# Patient Record
Sex: MALE | Race: BLACK OR AFRICAN AMERICAN | URBAN - METROPOLITAN AREA
[De-identification: names, ages, dates, MRNs, and addresses within clinical notes are randomized per-mention and may not be internally consistent; named-entity substitution may affect disease eponyms.]

---

## 2020-06-25 ENCOUNTER — HOSPITAL ENCOUNTER (OUTPATIENT)
Dept: MEDSURG UNIT | Facility: HOSPITAL | Age: 30
End: 2020-06-29
Attending: INTERNAL MEDICINE | Admitting: INTERNAL MEDICINE

## 2020-07-01 LAB
FINAL CULTURE: NORMAL
GRAM STN SPEC: NORMAL

## 2022-04-30 NOTE — ED PROVIDER NOTES
Patient:   Kvng Gonzales             MRN: [id number]            FIN: [id number]               Age:   30 years     Sex:  Male     :  1990   Associated Diagnoses:   Seizure; Hypoglycemia   Author:   aKden Walsh MD      Basic Information   Time seen: Immediately upon arrival.   History source: Patient, EMS.   Arrival mode: Ambulance.   History limitation: Clinical condition.   Additional information: Chief Complaint from Nursing Triage Note : Chief Complaint   2020 23:53 CDT      Chief Complaint           pt here c/o syncopal episode, was driving with friends and friends had him pull over. They called gene and reported pt had passed out, dheeraj unsure of length of episode. vss.  .   Provider/Visit info:   Time Seen:  Kaden Walsh MD / 2020 23:54  .   History of Present Illness   The patient presents with syncope.  The onset was just prior to arrival.  The course/duration of symptoms is episodic: with a single episode.  The location where the incident occurred was in the street.  The exacerbating factor is none.  The relieving factor is none.  Risk factors consist of HIV.  Prior episodes: none.  Therapy today: none.  Preceding symptoms: none.  Associated injury to the confusion after the event..     seizure activity reported by friends at scene.      Review of Systems   Constitutional symptoms:  No fever, no chills, no sweats.    Skin symptoms:  No rash,    Eye symptoms:  No recent vision problems,    ENMT symptoms:  No sore throat,    Respiratory symptoms:  No shortness of breath, no cough.    Cardiovascular symptoms:  Negative except as documented in HPI.   Gastrointestinal symptoms:  No abdominal pain, no nausea, no vomiting.    Genitourinary symptoms:  No dysuria,    Musculoskeletal symptoms:  No back pain, no Muscle pain.    Neurologic symptoms:  Negative except as documented in HPI.             Additional review of systems information: All other systems reviewed and otherwise  negative.      Health Status   Allergies:    Allergic Reactions (Selected)  Severity Not Documented  Penicillins- No reactions were documented.,    Allergies (1) Active Reaction  penicillins None Documented  .   Medications:  (Selected)   Prescriptions  Prescribed  Vitamin D3 1000 intl units oral tablet: 1,000 IntUnit = 1 tab(s), Oral, Daily, # 30 tab(s), 0 Refill(s), other reason (Rx)  efavirenz/emtricitabine/tenofovir 600 mg-200 mg-300 mg oral tablet: 1 tab(s), Oral, Daily, # 30 tab(s), 0 Refill(s), [facility]  ketoconazole 2% topical cream: 1 sandra, TOP, BID, apply to bilat groin area for rash, # 60 gm, 0 Refill(s), Pharmacy: [facility]  Documented Medications  Documented  ACETAMINOPHEN-COD #3 TABLET: 1 tab(s), Oral, q6hr  CLINDAMYCIN 150MG CAPSULES: 150 mg = 1 cap(s), Oral, QID.      Past Medical/ Family/ Social History   Medical history:    Resolved  HIV ([id number]):  Resolved..   Surgical history:    None..   Family history:    Alzheimer's disease  Father  Prostate cancer                                                                                                                                                                                                                         27-APR-2016 23:30:55<$>  Father  .   Social history:    Social & Psychosocial Habits    Alcohol  02/08/2016 Risk Assessment: Medium Risk    02/08/2016  Use: Current    Type: Beer, Liquor    Frequency: 1-2 times per week    Has alcohol use interfered with work or home life? No    Do you ever drink more than intended? No    Has anyone been hurt or at risk by your drinking? No    Ready to change: No    Concerns about alcohol use in household: No    Employment/School  02/08/2016 Risk Assessment: Not employed or in school    Exercise  02/08/2016 Risk Assessment: Regular exercise    Home/Environment  02/08/2016 Risk Assessment: No Risk    02/08/2016  Lives with: Room Mate    Alcohol abuse in  household: Yes    Substance abuse in household: No    Smoker in household: Yes    Injuries/Abuse/Neglect in household: No    Feels unsafe at home: No    Safe place to go: Yes    Agency(s)/Others notified: No    Family/Friends available to help: Yes    Concern for family members at home: No    Major illness in household: Yes    Financial concerns: Yes    Concerns over TV/Computer/Game use: No    Nutrition/Health  02/08/2016 Risk Assessment: No Risk    02/08/2016  Type of diet: Regular    Comment: Eating well - 02/08/2016 09:27 - Kelsea Contreras LPN    Substance Use  02/08/2016 Risk Assessment: Low Risk    02/08/2016  Use: Past    Type: Methamphetamines    Tobacco  02/08/2016 Risk Assessment: Medium Risk    02/08/2016  Use: Current every day smoker    Type: Cigarettes    Tobacco use per day: 8    Number of years: .5    Previous treatment: None    Ready to change: Yes    Concerns about tobacco use in household: Yes    06/25/2020  Use: Former smoker, quit more    Patient Wants Consult For Cessation Counseling N/A    06/26/2020  Use: Former smoker, quit more    Patient Wants Consult For Cessation Counseling N/A    Abuse/Neglect  06/26/2020  SHX Any signs of abuse or neglect No  .   Problem list:    Active Problems (1)  Tobacco user   .      Physical Examination               Vital Signs      Vital Signs (last 24 hrs)_____  Last Charted___________  Temp Oral     36.5 DegC  (JUN 25 23:53)  Heart Rate Peripheral   81 bpm  (JUN 25 23:53)  Resp Rate         20 br/min  (JUN 25 23:53)  SBP      132 mmHg  (JUN 25 23:53)  DBP      81 mmHg  (JUN 25 23:53)  SpO2      96 %  (JUN 25 23:53)  Weight      88.7 kg  (JUN 25 23:53)  Height      180 cm  (JUN 25 23:53)  BMI      27.38  (JUN 25 23:53)  .   General:  Alert, no acute distress.    Skin:  Warm, dry.    Head:  Normocephalic.   Neck:  No tenderness.   Eye:  Pupils are equal, round and reactive to light, extraocular movements are intact.    Ears, nose, mouth and throat:  Oral  mucosa moist.   Cardiovascular:  Regular rate and rhythm, No murmur, Normal peripheral perfusion, No edema.    Respiratory:  Lungs are clear to auscultation, respirations are non-labored, breath sounds are equal, Symmetrical chest wall expansion.    Gastrointestinal:  Soft, Nontender, Non distended, Normal bowel sounds, No organomegaly.    Back:  Normal alignment.   Musculoskeletal:  No swelling, no deformity.    Neurological:  Alert and oriented to person, place, time, and situation, normal motor observed, normal speech observed.    Psychiatric:  Cooperative.      Medical Decision Making   Documents reviewed:  Emergency department nurses' notes.   Results review:  Lab results : Lab View   6/26/2020 1:06 CDT       U pH                      6.5                             UA Appear                 Clear                             UA Color                  YELLOW                             UA Spec Grav              1.030                             UA Bili                   Negative                             UA pH                     6.5                             UA Urobilinogen           3 mg/dL                             UA Blood                  Negative                             UA Glucose                Negative                             UA Ketones                Trace                             UA Protein                20 mg/dL                             UA Nitrite                Negative                             UA Leuk Est               Negative                             UA WBC Interp             0-2 /HPF                             UA RBC Interp             0-2 /HPF                             UA Bact Interp            None Seen /HPF                             UA Squam Epi Interp        /LPF                             UA Hyal Cast Interp       0-2 /LPF                             U Temp                    24.5 DegC                             U Amph Scr                Negative                              U Barbie Scr                Negative                             U Benzodia Scr            Negative                             U Cannab Scr              Negative                             U Cocaine Scr             Negative                             U Opiate Scr              Negative                             U Phencyclidine Scr       Negative    6/26/2020 0:10 CDT       Sodium Lvl                140 mmol/L                             Potassium Lvl             3.6 mmol/L                             Chloride                  107 mmol/L                             CO2                       28 mmol/L                             Calcium Lvl               9.0 mg/dL                             Glucose Lvl               90 mg/dL                             BUN                       9 mg/dL                             Creatinine                1.10 mg/dL                             eGFR-AA                   103 mL/min                             eGFR-ESTEFANY                  85 mL/min  LOW                             Bili Total                0.7 mg/dL                             Bili Direct               0.2 mg/dL                             Bili Indirect             0.5 mg/dL                             AST                       28 unit/L                             ALT                       34 unit/L                             Alk Phos                  65 unit/L                             Total Protein             7.8 gm/dL                             Albumin Lvl               3.6 gm/dL                             Globulin                  4.20 gm/mL  HI                             A/G Ratio                 0.9 ratio  LOW                             WBC                       8.0 x10(3)/mcL                             RBC                       4.79 x10(6)/mcL                             Hgb                       14.7 gm/dL                             Hct                       43.2 %                              Platelet                  283 x10(3)/mcL                             MCV                       90.2 fL                             MCH                       30.7 pg                             MCHC                      34.0 gm/dL                             RDW                       13.3 %                             MPV                       9.6 fL                             Abs Neut                  3.16 x10(3)/mcL                             Neutro Auto               39 %  NA                             Lymph Auto                48 %  NA                             Mono Auto                 8 %  NA                             Eos Auto                  4 %  NA                             Abs Eos                   0.3 x10(3)/mcL                             Basophil Auto             1 %  NA                             Abs Neutro                3.16 x10(3)/mcL                             Abs Lymph                 3.9 x10(3)/mcL                             Abs Mono                  0.6 x10(3)/mcL                             Abs Baso                  0.1 x10(3)/mcL                             IG%                       0 %  NA                             IG#                       0.0100  NA  .      Impression and Plan   Diagnosis   Seizure (OGF76-YP R56.9)   Hypoglycemia (AMJ93-KF E16.2)   Plan   Condition: Guarded.    Disposition: Admit time  6/26/2020 02:15:00, Place in Observation Unit.

## 2022-05-02 NOTE — HISTORICAL [facility] CERNER
This is a historical note converted from Lilly. Formatting and pictures may have been removed.  Please reference Lilly for original formatting and attached multimedia. Admit and Discharge Dates  Admit Date: 06/25/2020  Discharge Date: 06/29/2020  Physicians  Attending Physician - Harshil BANUELOS, Yesica ELIZONDO  Admitting Physician - Harshil BANUELOS, Yesica ELIZONDO  Consulting Physician - Iglesia BANUELOS, John Teague  Consulting Physician - Fawn BANUELOS, Obi KHANNA  Primary Care Physician - No PCP, No  Discharge Diagnosis  New Onset Seizure suspected 2/2 hypoglycemia  HIV. CD4 998.  Hospital Course  30-year-old male presents emergency department via EMS after having a seizure. ?Patient states he was driving with some friends when he started feeling weird and pulled over. ?He states that his friends noted seizure activity and called EMS. ?When EMS arrived his CBG was in the 50s. ?They gave him D50 and brought him to emergency department. ?Patient denies any fevers, chills or shortness of breath. ?No coughing. ?No recent illnesses. ?He does state he started having skin breakdown and sores on his upper and lower extremities. ?Patient denies any nausea, vomiting, constipation or diarrhea. ?Last meal approximately 2 hours prior to seizure episode. ?States good appetite and eats 3 meals a day with no meal skipped.  ?  Seizure  -Possibly secondary to hypoglycemia; CBG 50 in the field; hypoglycemia resolved; No history of seizure disorder or prior seizures.  -No neuro deficits; CT head and MRI brain?with no intracranial processes; TTE negative  -D/c Keppra b/c patient c/o of?fatigue and worsening dizziness, ctm  -Pending further lumbar puncture results. Does not seem to be OI?with HIV due to CD4 of 998.  ?  HIV  -Not compliant with antiretrovirals for at least 3 months  -Follows with ID doctor, Dr Gill,?in [place], [place].? Continue Jenvoya. Will visit with Dr Gill after discharge.  -CD4 count of 998. Unlikely to be opportunistic infection.  -LP performed on  6/26. Cell count not indicative of active infection. Protein and glucose wnl. Awaiting?remaining LP studies upon discharge. Follow up on?LP studies vdrl, hiv vl, wnv pcr, hsv pcr, cryptococcal Ag, and serum cmv, syphilis studies, procalcitonin, crypto ag?.  ?  Hx of treated syphilis  -f/u repeat RPR today (cannot rule out prozone phenomenon)  Time Spent on discharge  >35 minutes  Objective  Vitals & Measurements  T:?36.6? ?C (Oral)? TMIN:?35.8? ?C (Axillary)? TMAX:?36.8? ?C (Oral)? HR:?76(Peripheral)? HR:?76(Monitored)? RR:?20? BP:?126/68? SpO2:?98%?  Physical Exam  General:?well-developed well-nourished in no acute distress  HENT:?normocephalic, atraumatic  Neck: full range of motion, no thyromegaly or lymphadenopathy  Cardiovascular:?regular rate and rhythm without murmurs, gallops or rubs; no peripheral edema  Gastrointestinal:?soft, non-tender, non-distended with normal bowel sounds, without masses to palpation  Musculoskeletal:?full range of motion of all extremities/spine without limitation or discomfort  Integumentary: no rashes or skin lesions present  Neurologic:? no signs of peripheral neurological deficit, motor/sensory function intact; neg brudzinski and kernig signs  ?  Patient Discharge Condition  clinically and hemodynamically stable  Discharge Disposition  1) ensure patient has not had any more seizures  2) no driving for 6 months  3) follow up remaining LP studies: vdrl, hiv vl, wnv pcr, hsv pcr, cryptococcal Ag, and serum cmv, syphilis studies, procalcitonin, crypto ag?.   Discharge Medication Reconciliation  Prescribed  acetaminophen-codeine (acetaminophen-codeine 300 mg-30 mg oral tablet)?1 tab(s), Oral, q6hr  Continue  cholecalciferol (Vitamin D3 1000 intl units oral tablet)?1,000 IntUnit, Oral, Daily  efavirenz/emtricitabine/tenofovir (efavirenz/emtricitabine/tenofovir 600 mg-200 mg-300 mg oral tablet)?1 tab(s), Oral, Daily  Discontinue  clindamycin (CLINDAMYCIN 150MG CAPSULES)?150 mg, Oral,  QID  ketoconazole topical (ketoconazole 2% topical cream)?1 sandra, TOP, BID  Education and Orders Provided  AIDS and the Nervous System  Epilepsy  Hypoglycemia  Lumbar Puncture  Discharge - 06/29/20 11:49:00 CDT, Home?  Follow up  Pt to Follow up with [facility] in [facility] with Dr. Gill  Will call patient if CSF studies come back abnormal  Report to ED if symptoms return / worsen      I discussed the case with the resident, assisted with the development of the assessment and agree with the plan of care.? Given was an isolated event described as a sz and pt didnt tolerate keppra, together agreed to d/c keppra.? Hypoglycemia also did not reoccur.? Counseled pt further workup for these unnecessary unless reoccurs.? No driving for 6 months, f/u w/ ID in [place] as above.

## 2022-05-02 NOTE — HISTORICAL [facility] CERNER
This is a historical note converted from Lilly. Formatting and pictures may have been removed.  Please reference Lilly for original formatting and attached multimedia. Chief Complaint  pt here c/o syncopal episode, was driving with friends and friends had him pull over. They called gene and reported pt had passed out, dheeraj unsure of length of episode. vss.  Reason for Consultation  HIV, AMS/Seizure  History of Present Illness  29 yo male with previously well controlled HIV disease admitted secondary to new onset seizure +/- syncopal episode.? ID consulted to provide recommendations as to same given his HIV status.  ?  CD4 found to be 998 with HIV VL pending at this juncture.?  ?  ?  Apparently friends noticed seizure activity, was found interestingly to have CBG of 50 in route also?  No cp/sob/ha/changes in vision  ?  Labs Last 24 Hours?  ?Chemistry? Hematology/Coagulation?   Sodium Lvl: 140 mmol/L (06/26/20 05:50:00) WBC: 7.4 x10(3)/mcL (06/26/20 05:50:00)   Potassium Lvl: 3.5 mmol/L (06/26/20 05:50:00) RBC: 4.54 x10(6)/mcL (06/26/20 05:50:00)   Chloride: 107 mmol/L (06/26/20 05:50:00) Hgb: 13.9 gm/dL (06/26/20 05:50:00)   CO2: 29 mmol/L (06/26/20 05:50:00) Hct: 40.6 % (06/26/20 05:50:00)   Calcium Lvl:?8.2 mg/dL?Low (06/26/20 05:50:00) Platelet: 251 x10(3)/mcL (06/26/20 05:50:00)   Magnesium Lvl: 2 mg/dL (06/26/20 01:10:00) MCV: 89.4 fL (06/26/20 05:50:00)   Glucose Lvl:?138 mg/dL?High (06/26/20 05:50:00) MCH: 30.6 pg (06/26/20 05:50:00)   BUN: 9 mg/dL (06/26/20 05:50:00) MCHC: 34.2 gm/dL (06/26/20 05:50:00)   Creatinine: 1 mg/dL (06/26/20 05:50:00) RDW: 13.2 % (06/26/20 05:50:00)   eGFR-AA: >105 (06/26/20 05:50:00) MPV: 9.8 fL (06/26/20 05:50:00)   eGFR-ESTEFANY: 95 mL/min (06/26/20 05:50:00) Abs Neut: 3.31 x10(3)/mcL (06/26/20 05:50:00)   Bili Total: 0.8 mg/dL (06/26/20 05:50:00) Neutro Auto: 45 % (06/26/20 05:50:00)   Bili Direct: 0.2 mg/dL (06/26/20 05:50:00) Lymph Auto: 42 % (06/26/20 05:50:00)   Bili  Indirect: 0.6 mg/dL (06/26/20 05:50:00) Mono Auto: 9 % (06/26/20 05:50:00)   AST: 22 unit/L (06/26/20 05:50:00) Eos Auto: 3 % (06/26/20 05:50:00)   ALT: 28 unit/L (06/26/20 05:50:00) Abs Eos: 0.2 x10(3)/mcL (06/26/20 05:50:00)   Alk Phos: 60 unit/L (06/26/20 05:50:00) Basophil Auto: 0 % (06/26/20 05:50:00)   Total Protein: 7 gm/dL (06/26/20 05:50:00) Abs Neutro: 3.31 x10(3)/mcL (06/26/20 05:50:00)   Albumin Lvl:?3.1 gm/dL?Low (06/26/20 05:50:00) Abs Lymph: 3 x10(3)/mcL (06/26/20 05:50:00)   Globulin:?3.9 gm/mL?High (06/26/20 05:50:00) Abs Mono: 0.7 x10(3)/mcL (06/26/20 05:50:00)   A/G Ratio:?0.8 ratio?Low (06/26/20 05:50:00) Abs Baso: 0 x10(3)/mcL (06/26/20 05:50:00)   Phosphorus:?2.1 mg/dL?Low (06/26/20 01:10:00) IG%: 0 % (06/26/20 05:50:00)   Vit D 25 OH:?20.3 ng/mL?Low (06/26/20 02:40:37) IG#: 0.02 (06/26/20 05:50:00)   Chol:?223 mg/dL?High (06/26/20 02:40:37)    HDL: 54 mg/dL (06/26/20 02:40:37)    Trig:?282 mg/dL?High (06/26/20 02:40:37)    LDL: 113 mg/dL (06/26/20 02:40:37)    Chol/HDL: 4.1 (06/26/20 02:40:37)    VLDL:?56 mg/dL?High (06/26/20 02:40:37)    TSH: 1.036 mIU/L (06/26/20 02:40:37)    U pH: 6.5 (06/26/20 01:06:00)    ?  Accession:?[id number]  Order:?MRI Brain W W/O Contrast  Report Dt/Tm:?06/26/2020 10:27  Report:?  EXAM: MRI Brain W W/O Contrast  ?  HISTORY: HIV, recurrent headaches, syncope  ?  COMPARISON STUDIES: CT head dated 6/26/2020  ?  TECHNIQUE:?  Multiplanar, multisequence MR images of the brain were obtained with  and without administration of intravenous contrast.  ?  DISCUSSION:  There is no restricted diffusion, hemorrhage or edema. There is linear  T2 hyperintensity in the body of the corpus callosum which may be from  a prior insult. The brain parenchyma is otherwise normal in signal.  There is no abnormal parenchymal or leptomeningeal enhancement.?  ?  There is no mass effect or midline shift. The basal cisterns are  patent. The ventricles and sulci are normal in size. There is  no  abnormal extra-axial fluid collection. The major intracranial flow  voids are patent. The paranasal sinuses and mastoid air cells are  clear.  ?  ?  IMPRESSION:?  No acute intracranial abnormality.  ?  Accession:?[id number]  Order:?CT Head W/O Contrast  Report Dt/Tm:?06/26/2020 07:54  Report:?  EXAMINATION  CT Head W/O Contrast  ?  TECHNIQUE  ?? ? Axial CT images were acquired without the intravenous  administration of iodine-based contrast media.  ?? ? Multiplanar reconstructions were accomplished by a CT  technologist at a separate workstation and pushed to PACS for  physician review.  ?  Total DLP (mGy-cm): 1196.3  (value may include radiation due to concomitantly performed CT  imaging)  ?? ? Automated tube current modulation and/or weight-based exposure  dosing is utilized, when appropriate, to reach lowest reasonably  achievable exposure rate.  ?  INDICATION  Other (please specify)  ?  Comparison: No similar modality comparisons are available at the time  of exam interpretation.  ?  FINDINGS  Images were reviewed in subdural, brain, soft tissue, and bone  windows.  ?  Exam quality: Motion/streak artifact limits assessment of the  posterior fossa.  ?  Brain sulci: Appropriate for patients age.  Ventricles: Normal size and configuration. No hydrocephalus.  ?  Extra-axial spaces: No masses or fluid collections.  ?  Parenchyma:?  No abnormal attenuation.  No discrete mass, hemorrhage, or CT evidence of an acute vascular  insult.  Gray-white differentiation is preserved.  ?  Midline shift: None.  ?  Dural sinuses: No abnormal densities.  Sellar/Suprasellar region: No abnormalities.  ?  Scalp/Skull: No abnormalities.  Skull base: Mastoid air cells are well aerated. No focal abnormality.  ?  Facial structures: Unremarkable.  Vasculature: No hyperdense artery identified.  ?  IMPRESSION  No acute or focal intracranial process.  ?  ?  No significant discrepancy identified in relation to the  teleradiology  preliminary report. Additional details provided above.  ?  ?  Review of Systems  Full 14 point ROS performed, all negative except as mentioned in HPI  Physical Exam  Vitals & Measurements  T:?36.6? ?C (Oral)? TMIN:?36.4? ?C (Oral)? TMAX:?36.6? ?C (Oral)? HR:?59(Peripheral)? RR:?16? BP:?101/67? SpO2:?100%? WT:?88.7?kg? BMI:?27.38?  Gen:? Awake and alert  ENT:? no thrush  CVS:? RRR no m/r/g  C/L:? CTA bilaterally  Assessment/Plan  HIV disease?B20  ?Has been out of care for some time, though CD4 of 900s.? HIV VL pending  HIV Genosure prime ordered  CMV QPCR plus syphilis studies  Needs LP and csf eval for : vdrl, hiv vl, wnv pcr, hsv pcr, cryptococcal Ag, and serum cmv, syphilis studies, procalcitonin, crypto ag?  Needs EEG and echo  Monitor glucose levels while inpatient  Ordered cxr  ?  Seizure?R56.9  Syncope/Near syncope?[id number]  Please call with any questions over the weekend.? Overall, does not appear to be consistent with HIV associated OI especially given the fact that he has a cd4 of 900s. Could certainly be syphilis however also lower on the differential list given negative RPR unless of course this is prozone phenomenon.? Would repeat RPR on monday.  Send syphilis studies in CSF.? Agree with empiric anti-epileptic therapy  ?  MD Henri  ID Staff   Problem List/Past Medical History  Ongoing  Tobacco user  Historical  HIV  Procedure/Surgical History  None   Medications  Inpatient  Ativan 2 mg/mL injectable solution, 1 mg= 0.5 mL, IV Push, q5min, PRN  Keppra, 500 mg= 1 tab(s), Oral, BID  lidocaine 2% injectable solution, 10 mL, ID, Once  Lovenox, 40 mg= 0.4 mL, Subcutaneous, Daily  Home  ACETAMINOPHEN-COD #3 TABLET, 1 tab(s), Oral, q6hr  CLINDAMYCIN 150MG CAPSULES, 150 mg= 1 cap(s), Oral, QID  efavirenz/emtricitabine/tenofovir 600 mg-200 mg-300 mg oral tablet, 1 tab(s), Oral, Daily  ketoconazole 2% topical cream, 1 sandra, TOP, BID  Vitamin D3 1000 intl units oral  tablet, 1000 IntUnit= 1 tab(s), Oral, Daily  Allergies  penicillins  Social History  Abuse/Neglect  No, 06/26/2020  Alcohol - Medium Risk, 02/08/2016  Current, Beer, Liquor, 1-2 times per week, Alcohol use interferes with work or home: No. Drinks more than intended: No. Others hurt by drinking: No. Ready to change: No. Household alcohol concerns: No., 02/08/2016  Employment/School - Not employed or in school, 02/08/2016  Exercise - Regular exercise, 02/08/2016  Home/Environment - No Risk, 02/08/2016  Lives with Room Mate. Alcohol abuse in household: Yes. Substance abuse in household: No. Smoker in household: Yes. Injuries/Abuse/Neglect in household: No. Feels unsafe at home: No. Safe place to go: Yes. Agency(s)/Others notified: No. Family/Friends available for support: Yes. Concern for family members at home: No. Major illness in household: Yes. Financial concerns: Yes. TV/Computer concerns: No., 02/08/2016  Nutrition/Health - No Risk, 02/08/2016  Regular, 02/08/2016  Substance Use - Low Risk, 02/08/2016  Past, Methamphetamines, 02/08/2016  Tobacco - Medium Risk, 02/08/2016  Former smoker, quit more than 30 days ago, N/A, 06/26/2020  Former smoker, quit more than 30 days ago, N/A, 06/25/2020  Current every day smoker, Cigarettes, 8 per day. .5 year(s). Previous treatment: None. Ready to change: Yes. Household tobacco concerns: Yes., 02/08/2016  Family History  Alzheimers disease: Father.  Prostate cancer 27-APR-2016 23:30:55<$>: Father.  Immunizations  Vaccine Date Status   pneumococcal 13-valent conjugate vaccine 02/08/2016 Given   human papillomavirus vaccine 02/08/2016 Given